# Patient Record
Sex: MALE | Race: WHITE | NOT HISPANIC OR LATINO | Employment: OTHER | URBAN - METROPOLITAN AREA
[De-identification: names, ages, dates, MRNs, and addresses within clinical notes are randomized per-mention and may not be internally consistent; named-entity substitution may affect disease eponyms.]

---

## 2020-03-19 PROBLEM — R06.02 SOB (SHORTNESS OF BREATH): Status: ACTIVE | Noted: 2020-03-19

## 2020-03-19 PROBLEM — E78.5 HYPERLIPIDEMIA LDL GOAL <70: Status: ACTIVE | Noted: 2020-03-19

## 2020-03-19 PROBLEM — Z72.0 TOBACCO ABUSE: Status: ACTIVE | Noted: 2020-03-19

## 2020-03-19 PROBLEM — I25.118 CORONARY ARTERY DISEASE OF NATIVE ARTERY OF NATIVE HEART WITH STABLE ANGINA PECTORIS (HCC): Status: ACTIVE | Noted: 2020-03-19

## 2020-03-19 PROBLEM — I10 ESSENTIAL HYPERTENSION: Status: ACTIVE | Noted: 2020-03-19

## 2020-03-19 NOTE — PROGRESS NOTES
OFFICE VISIT  NOTE  [facility] CARDIOLOGY      Name: Markell Barry    Date: 3/23/2020  MRN:  [id number]  :  1960      REFERRING/PRIMARY PROVIDER:  Nandini Hurst*    Chief Complaint   Patient presents with   • Heart Problem   • Cardiomyopathy       HPI: Markell Barry is a 59 y.o. male who presents today for new consultation to establish care for CAD.  Patient has a known history of CAD status post stenting in  and  at [facility], in  he had a cath with Dr. Yan at [place], residual LAD circumflex and RCA stenosis, hypertension, hyperlipidemia, tobacco abuse, COPD.  Last saw cardiology in [place] with SARMAD Hernandez in 2018.  He states last stress test and echo were with Dr. Yan in [place] in approximately 2018.  He has had 2 episodes of chest pain in the past year, both occurred with extreme exertion, working on his farm, the last approximately 5 to 10 minutes, chest pain was squeezing, substernal, relieved with sitting down or resting.  He also took nitroglycerin after 1 event and it resolved the chest pain within 5 minutes.  He did not seek medical attention on either these occasions.  His main complaint is fatigue and shortness of breath with activity, but the symptoms have been stable over the past 2 to 3 years.  He is trying to stop smoking, still smoking 1 to 1.5 packs/day, he has Chantix, he is going to start it in the next 1 to 2 weeks.  Patient reports side effect of constipation with Ranexa.    Past Medical History:   Diagnosis Date   • COPD (chronic obstructive pulmonary disease) (CMS/[facility])    • Coronary artery disease    • Easy bruising    • Emphysema of lung (CMS/[facility])    • Hyperlipidemia    • Hypertension    • Mumps    • Myocardial infarction (CMS/HCC)    • Stroke (cerebrum) (CMS/[facility])     mini stroke       Past Surgical History:   Procedure Laterality Date   • CORONARY ANGIOPLASTY WITH STENT PLACEMENT  2000    another  stent within a stent in 2005       Social History     Socioeconomic History   • Marital status:      Spouse name: Not on file   • Number of children: Not on file   • Years of education: Not on file   • Highest education level: Not on file   Tobacco Use   • Smoking status: Current Every Day Smoker     Packs/day: 1.50     Types: Cigarettes   • Smokeless tobacco: Never Used   Substance and Sexual Activity   • Alcohol use: Yes     Comment: 1 can of beer a month   • Drug use: Never   • Sexual activity: Defer       Family History   Problem Relation Age of Onset   • Heart disease Mother    • Aneurysm Mother    • Heart disease Father    • Diabetes Father    • COPD Sister    • Lung cancer Sister    • Hypertension Brother    • Heart attack Maternal Uncle    • Heart attack Paternal Uncle    • Heart attack Maternal Grandmother    • COPD Other         ROS:   Constitutional no fever,  no weight loss   Skin no rash, no subcutaneous nodules   Otolaryngeal no difficulty swallowing   Cardiovascular See HPI   Pulmonary no cough, no sputum production   Gastrointestinal no constipation, no diarrhea   Genitourinary no dysuria, no hematuria   Hematologic no easy bruisability, no abnormal bleeding   Musculoskeletal no muscle pain   Neurologic no dizziness, no falls         Allergies   Allergen Reactions   • Amoxicillin Swelling         Current Outpatient Medications:   •  amitriptyline (ELAVIL) 50 MG tablet, Take 50 mg by mouth Every Night., Disp: , Rfl:   •  amLODIPine (NORVASC) 10 MG tablet, Take 10 mg by mouth Daily., Disp: , Rfl:   •  aspirin 325 MG tablet, Take 325 mg by mouth Daily., Disp: , Rfl:   •  clopidogrel (PLAVIX) 75 MG tablet, Take 75 mg by mouth Daily., Disp: , Rfl:   •  Fluticasone-Umeclidin-Vilant (Trelegy Ellipta) 100-62.5-25 MCG/INH aerosol powder , Inhale 1 puff., Disp: , Rfl:   •  isosorbide mononitrate (IMDUR) 60 MG 24 hr tablet, Take 60 mg by mouth Daily., Disp: , Rfl:   •  lisinopril (PRINIVIL,ZESTRIL) 20 MG  "tablet, Take 20 mg by mouth Daily., Disp: , Rfl:   •  meloxicam (MOBIC) 15 MG tablet, Take 15 mg by mouth Daily., Disp: , Rfl:   •  metFORMIN (GLUCOPHAGE) 500 MG tablet, Take 500 mg by mouth 2 (Two) Times a Day With Meals., Disp: , Rfl:   •  sodium chloride 0.9 % nebulizer solution 0.82 mL with albuterol (5 MG/ML) 0.5% nebulizer solution 0.9 mg, Take 15 mg/hr by nebulization As Needed., Disp: , Rfl:   •  metoprolol tartrate (LOPRESSOR) 25 MG tablet, Take 1 tablet by mouth 2 (Two) Times a Day., Disp: 180 tablet, Rfl: 3  •  varenicline (CHANTIX) 0.5 MG tablet, Take 0.5 mg by mouth 2 (Two) Times a Day., Disp: , Rfl:     Vitals:    03/23/20 1239   BP: 145/90   BP Location: Left arm   Patient Position: Sitting   Pulse: 94   Weight: 109 kg (239 lb 9.6 oz)   Height: 167.6 cm (66\")     Body mass index is 38.67 kg/m².    PHYSICAL EXAM:    General Appearance:   · well developed  · well nourished  HENT:   · oropharynx moist  · lips not cyanotic  Neck:  · thyroid not enlarged  · supple  Respiratory:  · no respiratory distress  · normal breath sounds  · no rales  Cardiovascular:  · no jugular venous distention  · regular rhythm  · apical impulse normal  · S1 normal, S2 normal  · no S3, no S4   · no murmur  · no rub, no thrill  · carotid pulses normal; no bruit  · pedal pulses normal  · lower extremity edema: none    Gastrointestinal:   · bowel sounds normal  · non-tender  · no hepatomegaly, no splenomegaly  Musculoskeletal:  · no clubbing of fingers.   · normocephalic, head atraumatic  Skin:   · warm, dry  Psychiatric:  · judgement and insight appropriate  · normal mood and affect    RESULTS:     ECG 12 Lead  Date/Time: 3/23/2020 1:21 PM  Performed by: Charly Aguilar MD  Authorized by: Charly Aguilar MD   Comparison: not compared with previous ECG   Previous ECG: no previous ECG available  Rhythm: sinus rhythm  Rate: normal  BPM: 83  QRS axis: left    Clinical impression: normal ECG  Comments: Anteroseptal Q waves noted, " poor R wave progression, left axis deviation.                   Labs:  No results found for: CHOL, TRIG, HDL, LDL, AST, ALT  No results found for: HGBA1C  No components found for: CREATINININE  No results found for: EGFRIFNONA      ASSESSMENT:  Problem List Items Addressed This Visit        Cardiovascular and Mediastinum    Coronary artery disease of native artery of native heart with stable angina pectoris (CMS/[facility]) - Primary    Overview     x3 stents (awaiting report)    2014 [facility] (awaiting official report from [facility] Dr. Pham)  · Residual 50% stenosis in LAD  · 40-50% stenosis in circ artery  · 30-40% stenosis of RCA         Relevant Medications    clopidogrel (PLAVIX) 75 MG tablet    isosorbide mononitrate (IMDUR) 60 MG 24 hr tablet    amLODIPine (NORVASC) 10 MG tablet    metoprolol tartrate (LOPRESSOR) 25 MG tablet    Essential hypertension    Relevant Medications    lisinopril (PRINIVIL,ZESTRIL) 20 MG tablet    amLODIPine (NORVASC) 10 MG tablet    metoprolol tartrate (LOPRESSOR) 25 MG tablet    Hyperlipidemia LDL goal <70       Respiratory    SOB (shortness of breath)       Other    Tobacco abuse          PLAN:    1.  Chest pain:  Stable angina, CCS class II  Wean off Ranexa due to constipation  Start metoprolol 25 mg twice daily  Continue as needed nitroglycerin  Continue amlodipine  Advised patient to call our office if he has any worsening symptoms, if he has unstable symptoms advised to go to the ER.    2.  CAD:  Status post PCI in 2000 and 2005 at [facility]  Addendum: I reviewed the patient's pharmacologic stress test dated 5/31/2018, interpreted by Dr. Adolfo Tavarez, negative for infarct or ischemia, echocardiogram same date shows normal ejection fraction EF 53% with no significant valvular abnormality.  Decrease aspirin 81 mg daily  Continue Plavix, due to high risk for recurrent ischemic events due to ongoing tobacco abuse, obesity, diabetes, hypertension  He may be a good candidate for  low-dose rivaroxaban, in the future.  We will discuss statins at next visit  Use NSAIDs sparingly    3.  Tobacco abuse:  The patient smokes 1-1.5 ppd and has done so for the past 40 years. I discussed the importance of tobacco cessation with the patient; specifically the reduced risk of cardiovascular disease if they stop. Free literature was provided to the patient. At this time the patient is willing to quit. We discussed the various options for smoking cessation, the importance of setting a quit date, and risks/benefits/alternatives of medications that may help. The patient decided to try Chantix. We will discuss their efforts at a scheduled follow up visit. 5 minutes was spent discussing smoking cessation.     4.  Uncontrolled hypertension:  Start metoprolol 12 5 mg twice daily, wean off Ranexa  Continue amlodipine and lisinopril  Long-term goal blood pressure is 130/80    5.  Diabetes mellitus type 2:  This patient will be a good candidate for an SGLT2 inhibitor, will discuss in further detail at next visit    5.  Hyperlipidemia:  We will obtain lipid results from PCP, he qualifies for moderate NC statin therapy due to diabetes and previous CAD.  Discuss statins with patient next visit.      Return to clinic in 3 months, or sooner as needed.    Thank you for the opportunity to share in the care of your patient; please do not hesitate to call me with any questions.     Charly Aguilar MD, [facility]  Office: [phone] [address] [place] [address]

## 2020-03-23 ENCOUNTER — CONSULT (OUTPATIENT)
Dept: CARDIOLOGY | Facility: CLINIC | Age: 60
End: 2020-03-23

## 2020-03-23 VITALS
HEART RATE: 94 BPM | DIASTOLIC BLOOD PRESSURE: 90 MMHG | HEIGHT: 66 IN | WEIGHT: 239.6 LBS | SYSTOLIC BLOOD PRESSURE: 145 MMHG | BODY MASS INDEX: 38.51 KG/M2

## 2020-03-23 DIAGNOSIS — I10 ESSENTIAL HYPERTENSION: ICD-10-CM

## 2020-03-23 DIAGNOSIS — I25.118 CORONARY ARTERY DISEASE OF NATIVE ARTERY OF NATIVE HEART WITH STABLE ANGINA PECTORIS (HCC): Primary | ICD-10-CM

## 2020-03-23 DIAGNOSIS — E78.5 HYPERLIPIDEMIA LDL GOAL <70: ICD-10-CM

## 2020-03-23 DIAGNOSIS — Z72.0 TOBACCO ABUSE: ICD-10-CM

## 2020-03-23 DIAGNOSIS — R06.02 SOB (SHORTNESS OF BREATH): ICD-10-CM

## 2020-03-23 PROCEDURE — 99204 OFFICE O/P NEW MOD 45 MIN: CPT | Performed by: INTERNAL MEDICINE

## 2020-03-23 PROCEDURE — 93000 ELECTROCARDIOGRAM COMPLETE: CPT | Performed by: INTERNAL MEDICINE

## 2020-03-23 RX ORDER — RANOLAZINE 1000 MG/1
1000 TABLET, EXTENDED RELEASE ORAL 2 TIMES DAILY
COMMUNITY
End: 2020-03-23

## 2020-03-23 RX ORDER — LISINOPRIL 20 MG/1
20 TABLET ORAL DAILY
COMMUNITY
End: 2020-07-13

## 2020-03-23 RX ORDER — VARENICLINE TARTRATE 0.5 MG/1
0.5 TABLET, FILM COATED ORAL 2 TIMES DAILY
COMMUNITY
End: 2020-07-13

## 2020-03-23 RX ORDER — AMLODIPINE BESYLATE 10 MG/1
10 TABLET ORAL DAILY
COMMUNITY

## 2020-03-23 RX ORDER — MELOXICAM 15 MG/1
15 TABLET ORAL DAILY
COMMUNITY

## 2020-03-23 RX ORDER — ISOSORBIDE MONONITRATE 60 MG/1
60 TABLET, EXTENDED RELEASE ORAL DAILY
COMMUNITY

## 2020-03-23 RX ORDER — ASPIRIN 325 MG
325 TABLET ORAL DAILY
COMMUNITY

## 2020-03-23 RX ORDER — AMITRIPTYLINE HYDROCHLORIDE 50 MG/1
50 TABLET, FILM COATED ORAL NIGHTLY
COMMUNITY

## 2020-03-23 RX ORDER — CLOPIDOGREL BISULFATE 75 MG/1
75 TABLET ORAL DAILY
COMMUNITY

## 2020-07-09 PROBLEM — R07.9 CHEST PAIN: Status: ACTIVE | Noted: 2020-07-09

## 2020-07-09 NOTE — PROGRESS NOTES
OFFICE VISIT  NOTE  [facility] CARDIOLOGY      Name: Markell Barry    Date: 2020  MRN:  [id number]  :  1960      REFERRING/PRIMARY PROVIDER:  Nandini Hurst APRN    Chief complaint: Follow-up for CAD and shortness of breath    HPI: Markell Barry is a 60 y.o. male who presents today for follow-up for CAD.  Patient has a known history of CAD status post stenting in  and  at [facility], in  he had a cath with Dr. Yan at [place], residual LAD circumflex and RCA stenosis, hypertension, hyperlipidemia, tobacco abuse, COPD.   Stress test 2018, normal, echocardiogram 2018 EF 53% with diastolic dysfunction, normal valves.    His main complaint is fatigue and shortness of breath with activity, but the symptoms have been stable over the past 2 to 3 years.  He is trying to stop smoking, still smoking 1 to 1.5 packs/day, he has Chantix.  He thinks inhalers are helping his breathing.  Denies chest pain.  Constipation resolved after stopping Chantix.  Tolerating metoprolol.  Home blood pressures running 140 over 90s consistently.    Past Medical History:   Diagnosis Date   • COPD (chronic obstructive pulmonary disease) (CMS/[facility])    • Coronary artery disease    • Easy bruising    • Emphysema of lung (CMS/[facility])    • Hyperlipidemia    • Hypertension    • Mumps    • Myocardial infarction (CMS/[facility])    • Stroke (cerebrum) (CMS/[facility])     mini stroke       Past Surgical History:   Procedure Laterality Date   • CORONARY ANGIOPLASTY WITH STENT PLACEMENT  2000    another stent within a stent in        Social History     Socioeconomic History   • Marital status:      Spouse name: Not on file   • Number of children: Not on file   • Years of education: Not on file   • Highest education level: Not on file   Tobacco Use   • Smoking status: Current Every Day Smoker     Packs/day: 1.50     Types: Cigarettes   • Smokeless tobacco: Never Used    Substance and Sexual Activity   • Alcohol use: Yes     Comment: 1 can of beer a month   • Drug use: Never   • Sexual activity: Defer       Family History   Problem Relation Age of Onset   • Heart disease Mother    • Aneurysm Mother    • Heart disease Father    • Diabetes Father    • COPD Sister    • Lung cancer Sister    • Hypertension Brother    • Heart attack Maternal Uncle    • Heart attack Paternal Uncle    • Heart attack Maternal Grandmother    • COPD Other         ROS:   Constitutional no fever,  no weight loss   Skin no rash, no subcutaneous nodules   Otolaryngeal no difficulty swallowing   Cardiovascular See HPI   Pulmonary no cough, no sputum production   Gastrointestinal no constipation, no diarrhea   Genitourinary no dysuria, no hematuria   Hematologic no easy bruisability, no abnormal bleeding   Musculoskeletal no muscle pain   Neurologic no dizziness, no falls         Allergies   Allergen Reactions   • Amoxicillin Swelling         Current Outpatient Medications:   •  amitriptyline (ELAVIL) 50 MG tablet, Take 50 mg by mouth Every Night., Disp: , Rfl:   •  amLODIPine (NORVASC) 10 MG tablet, Take 10 mg by mouth Daily., Disp: , Rfl:   •  aspirin 325 MG tablet, Take 325 mg by mouth Daily., Disp: , Rfl:   •  clopidogrel (PLAVIX) 75 MG tablet, Take 75 mg by mouth Daily., Disp: , Rfl:   •  Fluticasone-Umeclidin-Vilant (Trelegy Ellipta) 100-62.5-25 MCG/INH aerosol powder , Inhale 1 puff Daily., Disp: , Rfl:   •  isosorbide mononitrate (IMDUR) 60 MG 24 hr tablet, Take 60 mg by mouth Daily., Disp: , Rfl:   •  lisinopril (PRINIVIL,ZESTRIL) 20 MG tablet, Take 1 tablet by mouth 2 (Two) Times a Day., Disp: , Rfl:   •  meloxicam (MOBIC) 15 MG tablet, Take 15 mg by mouth Daily., Disp: , Rfl:   •  metFORMIN (GLUCOPHAGE) 500 MG tablet, Take 500 mg by mouth 2 (Two) Times a Day With Meals., Disp: , Rfl:   •  sodium chloride 0.9 % nebulizer solution 0.82 mL with albuterol (5 MG/ML) 0.5% nebulizer solution 0.9 mg, Take 15  "mg/hr by nebulization As Needed., Disp: , Rfl:   •  metoprolol tartrate (LOPRESSOR) 50 MG tablet, Take 1 tablet by mouth 2 (Two) Times a Day., Disp: 180 tablet, Rfl: 3    Vitals:    07/13/20 0916   BP: 140/94   BP Location: Right arm   Patient Position: Sitting   Pulse: 75   Temp: 96.8 °F (36 °C)   SpO2: 95%   Weight: 110 kg (242 lb 6.4 oz)   Height: 167.6 cm (66\")     Body mass index is 39.12 kg/m².    PHYSICAL EXAM:    General Appearance:   · well developed  · well nourished  HENT:   · oropharynx moist  · lips not cyanotic  Neck:  · thyroid not enlarged  · supple  Respiratory:  · no respiratory distress  · normal breath sounds  · no rales  Cardiovascular:  · no jugular venous distention  · regular rhythm  · apical impulse normal  · S1 normal, S2 normal  · no S3, no S4   · 2/6 systolic murmur best heard at the base  · no rub, no thrill  · carotid pulses normal; no bruit  · pedal pulses normal  · lower extremity edema: none    Gastrointestinal:   · bowel sounds normal  · non-tender  · no hepatomegaly, no splenomegaly  Musculoskeletal:  · no clubbing of fingers.   · normocephalic, head atraumatic  Skin:   · warm, dry  Psychiatric:  · judgement and insight appropriate  · normal mood and affect    RESULTS:   Procedures    Results for orders placed in visit on 06/01/18   SCANNED - ECHOCARDIOGRAM         Labs:  No results found for: CHOL, TRIG, HDL, LDL, AST, ALT  No results found for: HGBA1C  No components found for: CREATINININE  No results found for: EGFRIFNONA      ASSESSMENT:  Problem List Items Addressed This Visit        Cardiovascular and Mediastinum    Coronary artery disease of native artery of native heart with stable angina pectoris (CMS/HCC) - Primary    Overview     status post stenting in 2000 and 2005 at [facility]    10/29/2014 [facility]  · Residual 50% stenosis in LAD  · 40-50% stenosis in circ artery  · 30-40% stenosis of RCA         Relevant Medications    metoprolol tartrate " (LOPRESSOR) 50 MG tablet    Essential hypertension    Relevant Medications    lisinopril (PRINIVIL,ZESTRIL) 20 MG tablet    metoprolol tartrate (LOPRESSOR) 50 MG tablet    Hyperlipidemia LDL goal <70       Respiratory    SOB (shortness of breath)    Overview     06/01/2018 Echo  · LVEF 53%  · Mitral inflow reversal is consistent with possible diastolic dysfunction            Other    Tobacco abuse      Other Visit Diagnoses     Murmur        Relevant Orders    Adult Transthoracic Echo Complete W/ Cont if Necessary Per Protocol          PLAN:    1.  New systolic ejection murmur:  Check echocardiogram  Suspect mild aortic stenosis  Last echo 6/2018 did not mention valvular pathology.    2.  CAD:  Status post PCI in 2000 and 2005 at [facility]  Left stress test 6-2018 normal.  Decrease aspirin 81 mg daily  Continue Plavix, due to high risk for recurrent ischemic events due to ongoing tobacco abuse, obesity, diabetes, hypertension  He may be a good candidate for low-dose rivaroxaban, in the future.  We will discuss statins at next visit  Use NSAIDs sparingly  Doing well off Ranexa, continue metoprolol    3.  Tobacco abuse:  Discussed importance of complete tobacco cessation.    4.  Uncontrolled hypertension:  Increase metoprolol to 50 mg twice daily  Continue amlodipine and lisinopril  Long-term goal blood pressure is 130/80  Advised patient call in 2 to 3 weeks with average blood pressure readings, if not controlled will consider increasing metoprolol if heart rate is acceptable.    5.  Diabetes mellitus type 2:  This patient will be a good candidate for an SGLT2 inhibitor, will discuss in further detail at next visit    6.  Hyperlipidemia:  Lipid panel dated 7/10/2020: Total cholesterol 210, HDL 32, , triglycerides 335.  Start atorvastatin 40 mg daily      Return to clinic in 6 months, or sooner as needed.    Thank you for the opportunity to share in the care of your patient; please do not hesitate to call me with  any questions.     Charly Aguilar MD, [facility]  Office: [phone] [address] [place] [address]

## 2020-07-13 ENCOUNTER — TELEPHONE (OUTPATIENT)
Dept: CARDIOLOGY | Facility: CLINIC | Age: 60
End: 2020-07-13

## 2020-07-13 ENCOUNTER — OFFICE VISIT (OUTPATIENT)
Dept: CARDIOLOGY | Facility: CLINIC | Age: 60
End: 2020-07-13

## 2020-07-13 VITALS
DIASTOLIC BLOOD PRESSURE: 94 MMHG | WEIGHT: 242.4 LBS | BODY MASS INDEX: 38.96 KG/M2 | HEIGHT: 66 IN | OXYGEN SATURATION: 95 % | HEART RATE: 75 BPM | SYSTOLIC BLOOD PRESSURE: 140 MMHG | TEMPERATURE: 96.8 F

## 2020-07-13 DIAGNOSIS — I10 ESSENTIAL HYPERTENSION: ICD-10-CM

## 2020-07-13 DIAGNOSIS — I25.118 CORONARY ARTERY DISEASE OF NATIVE ARTERY OF NATIVE HEART WITH STABLE ANGINA PECTORIS (HCC): Primary | ICD-10-CM

## 2020-07-13 DIAGNOSIS — Z72.0 TOBACCO ABUSE: ICD-10-CM

## 2020-07-13 DIAGNOSIS — R06.02 SOB (SHORTNESS OF BREATH): ICD-10-CM

## 2020-07-13 DIAGNOSIS — E78.5 HYPERLIPIDEMIA LDL GOAL <70: ICD-10-CM

## 2020-07-13 DIAGNOSIS — R01.1 MURMUR: ICD-10-CM

## 2020-07-13 PROCEDURE — 99214 OFFICE O/P EST MOD 30 MIN: CPT | Performed by: INTERNAL MEDICINE

## 2020-07-13 RX ORDER — ATORVASTATIN CALCIUM 40 MG/1
40 TABLET, FILM COATED ORAL DAILY
Qty: 30 TABLET | Refills: 11 | Status: SHIPPED | OUTPATIENT
Start: 2020-07-13

## 2020-07-13 RX ORDER — METOPROLOL TARTRATE 50 MG/1
50 TABLET, FILM COATED ORAL 2 TIMES DAILY
Qty: 180 TABLET | Refills: 3 | Status: SHIPPED | OUTPATIENT
Start: 2020-07-13 | End: 2021-09-01

## 2020-07-13 RX ORDER — LISINOPRIL 20 MG/1
20 TABLET ORAL 2 TIMES DAILY
Status: SHIPPED
Start: 2020-07-13 | End: 2021-06-14

## 2020-07-13 NOTE — TELEPHONE ENCOUNTER
----- Message from Charly Aguilar MD sent at 7/13/2020  1:36 PM EDT -----   I reviewed his cholesterol numbers and they were elevated, I sent a prescription for atorvastatin 40 mg daily.  Please call the patient and let him know to  the prescription at [facility] pharmacy.  Thank you

## 2020-07-13 NOTE — TELEPHONE ENCOUNTER
Spoke with wife she will let the pt know to  atorvastatin 40 mg and start taking it daily. No further questions at this time.

## 2021-01-08 PROBLEM — E11.9 TYPE 2 DIABETES MELLITUS WITHOUT COMPLICATION, WITHOUT LONG-TERM CURRENT USE OF INSULIN (HCC): Status: ACTIVE | Noted: 2021-01-08

## 2021-06-10 PROBLEM — R01.1 SYSTOLIC MURMUR: Status: ACTIVE | Noted: 2021-06-10

## 2021-06-10 NOTE — PROGRESS NOTES
OFFICE VISIT  NOTE  [facility] CARDIOLOGY      Name: Markell Barry    Date: 2021  MRN:  [id number]  :  1960      REFERRING/PRIMARY PROVIDER:  Nandini Hurst APRN    Chief complaint: Follow-up for CAD and shortness of breath    HPI: Markell Barry is a 61 y.o. male who presents today for follow-up for CAD.  Patient has a known history of CAD status post stenting in  and  at [facility], in  he had a cath with Dr. Yan at [place], residual LAD circumflex and RCA stenosis, hypertension, hyperlipidemia, tobacco abuse, COPD.   Stress test 2018, normal, echocardiogram 2018 EF 53% with diastolic dysfunction, normal valves.    Still with chest pain or shortness of breath with any activity, getting worse.  Still smoking 1 to 1.5 pack/day.  Was unable to get echo done last time because of flulike illness.  Home blood pressure 160s 180s systolic.    Past Medical History:   Diagnosis Date   • COPD (chronic obstructive pulmonary disease) (CMS/[facility])    • Coronary artery disease    • Easy bruising    • Emphysema of lung (CMS/[facility])    • Hyperlipidemia    • Hypertension    • Mumps    • Myocardial infarction (CMS/[facility])    • Stroke (cerebrum) (CMS/[facility])     mini stroke       Past Surgical History:   Procedure Laterality Date   • CORONARY ANGIOPLASTY WITH STENT PLACEMENT  2000    another stent within a stent in        Social History     Socioeconomic History   • Marital status:      Spouse name: Not on file   • Number of children: Not on file   • Years of education: Not on file   • Highest education level: Not on file   Tobacco Use   • Smoking status: Current Every Day Smoker     Packs/day: 1.50     Types: Cigarettes   • Smokeless tobacco: Never Used   Vaping Use   • Vaping Use: Never used   Substance and Sexual Activity   • Alcohol use: Yes     Comment: 1 can of beer a month   • Drug use: Never   • Sexual activity: Defer       Family History   Problem  Relation Age of Onset   • Heart disease Mother    • Aneurysm Mother    • Heart disease Father    • Diabetes Father    • COPD Sister    • Lung cancer Sister    • Hypertension Brother    • Heart attack Maternal Uncle    • Heart attack Paternal Uncle    • Heart attack Maternal Grandmother    • COPD Other         ROS:   Constitutional no fever,  no weight loss   Skin no rash, no subcutaneous nodules   Otolaryngeal no difficulty swallowing   Cardiovascular See HPI   Pulmonary no cough, no sputum production   Gastrointestinal no constipation, no diarrhea   Genitourinary no dysuria, no hematuria   Hematologic no easy bruisability, no abnormal bleeding   Musculoskeletal no muscle pain   Neurologic no dizziness, no falls         Allergies   Allergen Reactions   • Amoxicillin Swelling         Current Outpatient Medications:   •  amitriptyline (ELAVIL) 50 MG tablet, Take 50 mg by mouth Every Night., Disp: , Rfl:   •  amLODIPine (NORVASC) 10 MG tablet, Take 10 mg by mouth Daily., Disp: , Rfl:   •  aspirin 325 MG tablet, Take 325 mg by mouth Daily., Disp: , Rfl:   •  atorvastatin (LIPITOR) 40 MG tablet, Take 1 tablet by mouth Daily., Disp: 30 tablet, Rfl: 11  •  chlorthalidone (HYGROTON) 25 MG tablet, Take 25 mg by mouth Daily., Disp: , Rfl:   •  clopidogrel (PLAVIX) 75 MG tablet, Take 75 mg by mouth Daily., Disp: , Rfl:   •  Fluticasone-Umeclidin-Vilant (Trelegy Ellipta) 100-62.5-25 MCG/INH aerosol powder , Inhale 1 puff Daily., Disp: , Rfl:   •  isosorbide mononitrate (IMDUR) 60 MG 24 hr tablet, Take 60 mg by mouth Daily., Disp: , Rfl:   •  meloxicam (MOBIC) 15 MG tablet, Take 15 mg by mouth Daily., Disp: , Rfl:   •  metFORMIN (GLUCOPHAGE) 500 MG tablet, Take 500 mg by mouth 2 (Two) Times a Day With Meals., Disp: , Rfl:   •  metoprolol tartrate (LOPRESSOR) 50 MG tablet, Take 1 tablet by mouth 2 (Two) Times a Day., Disp: 180 tablet, Rfl: 3  •  sodium chloride 0.9 % nebulizer solution 0.82 mL with albuterol (5 MG/ML) 0.5%  "nebulizer solution 0.9 mg, Take 15 mg/hr by nebulization As Needed., Disp: , Rfl:   •  valsartan (DIOVAN) 160 MG tablet, Take 2 tablets by mouth Daily., Disp: 90 tablet, Rfl: 3    Vitals:    06/14/21 0937   BP: 166/96   BP Location: Left arm   Patient Position: Sitting   Pulse: 73   SpO2: 97%   Weight: 110 kg (243 lb)   Height: 167.6 cm (66\")     Body mass index is 39.22 kg/m².    PHYSICAL EXAM:    General Appearance:   · well developed  · well nourished  HENT:   · oropharynx moist  · lips not cyanotic  Neck:  · thyroid not enlarged  · supple  Respiratory:  · no respiratory distress  · normal breath sounds  · no rales  Cardiovascular:  · no jugular venous distention  · regular rhythm  · apical impulse normal  · S1 normal, S2 normal  · no S3, no S4   · 2/6 systolic murmur best heard at the base  · no rub, no thrill  · carotid pulses normal; no bruit  · pedal pulses normal  · lower extremity edema: none    Gastrointestinal:   · bowel sounds normal  · non-tender  · no hepatomegaly, no splenomegaly  Musculoskeletal:  · no clubbing of fingers.   · normocephalic, head atraumatic  Skin:   · warm, dry  Psychiatric:  · judgement and insight appropriate  · normal mood and affect    RESULTS:   Procedures    Results for orders placed in visit on 06/01/18    SCANNED - ECHOCARDIOGRAM        Labs:  No results found for: CHOL, TRIG, HDL, LDL, AST, ALT  No results found for: HGBA1C  No components found for: CREATINININE  No results found for: EGFRIFNONA      ASSESSMENT:  Problem List Items Addressed This Visit        Cardiac and Vasculature    Coronary artery disease of native artery of native heart with stable angina pectoris (CMS/HCC) - Primary    Overview     status post stenting in 2000 and 2005 at [facility]    10/29/2014 [facility]  · Residual 50% stenosis in LAD  · 40-50% stenosis in circ artery  · 30-40% stenosis of RCA         Essential hypertension    Relevant Medications    chlorthalidone (HYGROTON) " 25 MG tablet    valsartan (DIOVAN) 160 MG tablet    Hyperlipidemia LDL goal <70    Chest pain    Overview     05/31/2018 Stress test @ [facility]  · No significant ischemia detected         Systolic murmur       Endocrine and Metabolic    Type 2 diabetes mellitus without complication, without long-term current use of insulin (CMS/[facility])       Pulmonary and Pneumonias    SOB (shortness of breath)    Overview     06/01/2018 Echo  · LVEF 53%  · Mitral inflow reversal is consistent with possible diastolic dysfunction            Tobacco    Tobacco abuse          PLAN:    1.  New systolic ejection murmur:  Echocardiogram rescheduled.  Suspect mild aortic stenosis  Last echo 6/2018 did not mention valvular pathology.    2.  CAD:  Status post PCI in 2000 and 2005 at [facility]  Left stress test 6-2018 normal.  Continue aspirin 81 mg daily  Continue Plavix, due to high risk for recurrent ischemic events due to ongoing tobacco abuse, obesity, diabetes, hypertension    Worsening chest pain shortness of breath, proceed with echo and Lexiscan nuclear stress test for further stratification.  The patient was advised to call 911 if chest pain worsens or persist despite nitroglycerin or rest.    3.  Tobacco abuse:  Discussed importance of complete tobacco cessation.    4.  Uncontrolled hypertension:  Increase valsartan to 320 mg daily  Continue all other antihypertensives  Long-term goal blood pressure is 130/80    5.  Diabetes mellitus type 2:  Consider SGLT2 inhibitor in the future.    6.  Hyperlipidemia:  Lipid panel dated 7/10/2020: Total cholesterol 210, HDL 32, , triglycerides 335.  Continue atorvastatin 40 mg daily      Return to clinic in 6 months, or sooner as needed.    Thank you for the opportunity to share in the care of your patient; please do not hesitate to call me with any questions.     Charly Aguilar MD, [facility]  Office: [phone]  [facility]  [address] [place] [address]

## 2021-06-14 ENCOUNTER — OFFICE VISIT (OUTPATIENT)
Dept: CARDIOLOGY | Facility: CLINIC | Age: 61
End: 2021-06-14

## 2021-06-14 VITALS
SYSTOLIC BLOOD PRESSURE: 166 MMHG | WEIGHT: 243 LBS | BODY MASS INDEX: 39.05 KG/M2 | OXYGEN SATURATION: 97 % | HEART RATE: 73 BPM | HEIGHT: 66 IN | DIASTOLIC BLOOD PRESSURE: 96 MMHG

## 2021-06-14 DIAGNOSIS — R06.02 SOB (SHORTNESS OF BREATH): ICD-10-CM

## 2021-06-14 DIAGNOSIS — R26.81 UNSTEADY GAIT WHEN WALKING: ICD-10-CM

## 2021-06-14 DIAGNOSIS — I10 ESSENTIAL HYPERTENSION: ICD-10-CM

## 2021-06-14 DIAGNOSIS — E78.5 HYPERLIPIDEMIA LDL GOAL <70: ICD-10-CM

## 2021-06-14 DIAGNOSIS — R07.9 CHEST PAIN, UNSPECIFIED TYPE: ICD-10-CM

## 2021-06-14 DIAGNOSIS — I25.118 CORONARY ARTERY DISEASE OF NATIVE ARTERY OF NATIVE HEART WITH STABLE ANGINA PECTORIS (HCC): Primary | ICD-10-CM

## 2021-06-14 DIAGNOSIS — R01.1 SYSTOLIC MURMUR: ICD-10-CM

## 2021-06-14 DIAGNOSIS — Z72.0 TOBACCO ABUSE: ICD-10-CM

## 2021-06-14 DIAGNOSIS — E11.9 TYPE 2 DIABETES MELLITUS WITHOUT COMPLICATION, WITHOUT LONG-TERM CURRENT USE OF INSULIN (HCC): ICD-10-CM

## 2021-06-14 PROCEDURE — 99214 OFFICE O/P EST MOD 30 MIN: CPT | Performed by: INTERNAL MEDICINE

## 2021-06-14 RX ORDER — VALSARTAN 160 MG/1
160 TABLET ORAL DAILY
COMMUNITY
Start: 2021-05-13 | End: 2021-06-14

## 2021-06-14 RX ORDER — VALSARTAN 160 MG/1
320 TABLET ORAL DAILY
Qty: 90 TABLET | Refills: 3 | Status: SHIPPED | OUTPATIENT
Start: 2021-06-14

## 2021-06-14 RX ORDER — CHLORTHALIDONE 25 MG/1
25 TABLET ORAL DAILY
COMMUNITY
Start: 2021-05-30

## 2021-07-01 ENCOUNTER — TELEPHONE (OUTPATIENT)
Dept: CARDIOLOGY | Facility: CLINIC | Age: 61
End: 2021-07-01

## 2021-07-01 NOTE — TELEPHONE ENCOUNTER
Pt called states he had a stroke on 6/28 and was admitted to [facility] from 6/28-6/30 (I have requested all records from this stay for R[facility] to review).    Pt has an echo and stress test scheduled on 7/28 and wanted to know if he should still have those completed, he believes they did an echo while admitted. Pt reports BP is still elevated- this morning at 8:54 am BP was 213/97. He took his medication and recheck BP again at 9 am (6 min later) BP was 184/87. A third reading was taken around 10:30 and BP was 170/92. Pt states the hospital prescribed him clonidine 0.1 mg as needed for blood pressure 160 or higher. He hasn't taken that yet.     Advised to take a clonidine and monitor his BP abut 2 hours after. Advised to continue taking all other medications but to only take his BP approximately 2 hours after meds to ensure he doesn't increase his BP by taking it back to back. Advised if BP stays elevated 190-200 even after he takes clonidine to call EMS. We will review hospital records and reach out with RDS recommendations next week. Pt agreeable and verbalized understanding.

## 2021-07-07 NOTE — TELEPHONE ENCOUNTER
Agree with plan on blood pressure.  If he had an echo we can cancel our echo, continue stress test, make the Jazz.

## 2021-07-28 ENCOUNTER — HOSPITAL ENCOUNTER (OUTPATIENT)
Dept: CARDIOLOGY | Facility: HOSPITAL | Age: 61
End: 2021-07-28

## 2021-07-28 ENCOUNTER — APPOINTMENT (OUTPATIENT)
Dept: CARDIOLOGY | Facility: HOSPITAL | Age: 61
End: 2021-07-28

## 2021-09-01 RX ORDER — METOPROLOL TARTRATE 50 MG/1
TABLET, FILM COATED ORAL
Qty: 180 TABLET | Refills: 3 | Status: SHIPPED | OUTPATIENT
Start: 2021-09-01

## 2021-09-13 ENCOUNTER — APPOINTMENT (OUTPATIENT)
Dept: CARDIOLOGY | Facility: HOSPITAL | Age: 61
End: 2021-09-13

## 2021-09-13 ENCOUNTER — HOSPITAL ENCOUNTER (OUTPATIENT)
Dept: CARDIOLOGY | Facility: HOSPITAL | Age: 61
End: 2021-09-13

## 2021-12-14 ENCOUNTER — OUTSIDE FACILITY SERVICE (OUTPATIENT)
Dept: CARDIOLOGY | Facility: CLINIC | Age: 61
End: 2021-12-14

## 2021-12-14 PROCEDURE — 93306 TTE W/DOPPLER COMPLETE: CPT | Performed by: INTERNAL MEDICINE

## 2021-12-14 PROCEDURE — 99221 1ST HOSP IP/OBS SF/LOW 40: CPT | Performed by: INTERNAL MEDICINE

## 2021-12-15 ENCOUNTER — OUTSIDE FACILITY SERVICE (OUTPATIENT)
Dept: CARDIOLOGY | Facility: CLINIC | Age: 61
End: 2021-12-15

## 2021-12-15 PROCEDURE — 99232 SBSQ HOSP IP/OBS MODERATE 35: CPT | Performed by: INTERNAL MEDICINE

## 2021-12-16 ENCOUNTER — OUTSIDE FACILITY SERVICE (OUTPATIENT)
Dept: CARDIOLOGY | Facility: CLINIC | Age: 61
End: 2021-12-16

## 2021-12-16 PROCEDURE — 99231 SBSQ HOSP IP/OBS SF/LOW 25: CPT | Performed by: INTERNAL MEDICINE

## 2021-12-17 ENCOUNTER — OUTSIDE FACILITY SERVICE (OUTPATIENT)
Dept: CARDIOLOGY | Facility: CLINIC | Age: 61
End: 2021-12-17

## 2021-12-17 PROCEDURE — 99231 SBSQ HOSP IP/OBS SF/LOW 25: CPT | Performed by: INTERNAL MEDICINE
